# Patient Record
Sex: FEMALE
[De-identification: names, ages, dates, MRNs, and addresses within clinical notes are randomized per-mention and may not be internally consistent; named-entity substitution may affect disease eponyms.]

---

## 2021-03-30 PROBLEM — Z00.00 ENCOUNTER FOR PREVENTIVE HEALTH EXAMINATION: Status: ACTIVE | Noted: 2021-03-30

## 2021-04-26 ENCOUNTER — APPOINTMENT (OUTPATIENT)
Dept: NEUROSURGERY | Facility: CLINIC | Age: 46
End: 2021-04-26
Payer: SELF-PAY

## 2021-04-26 VITALS — WEIGHT: 190 LBS | BODY MASS INDEX: 27.2 KG/M2 | HEIGHT: 70 IN

## 2021-04-26 DIAGNOSIS — Z78.9 OTHER SPECIFIED HEALTH STATUS: ICD-10-CM

## 2021-04-26 PROCEDURE — ZZZZZ: CPT

## 2021-04-26 NOTE — REASON FOR VISIT
[Home] : at home, [unfilled] , at the time of the visit. [Medical Office: ([facility])___] : at the medical office located in  [Verbal consent obtained from patient] : the patient, [unfilled] [New Patient Visit] : a new patient visit [FreeTextEntry1] : r/o Betty

## 2021-04-26 NOTE — HISTORY OF PRESENT ILLNESS
[de-identified] : Ms. Middleton is a pleasant 47yo female who presents today with a history of many neurological symptoms who presents today for follow up.  She is a known patient to me from [place] where I saw her once in 6/2020 for work up of possible dural AVF.  For the past three years she has been having worsening symptoms which include confirmed urine retention, sustained ankle clonus, +hoffmans, hyperreflexia in her knees.  She is under the care of many neurologists who she states cannot figure out a diagnosis despite multiple tests.  She also has had a rheumatology work up which was negative.  Most recently, she had a catheter angiogram at [facility] which she states has been reviewed by multiple neurosurgeons and confirmed negative.  Her symptoms also include vertigo/blurry vision with exertion, intermittent headaches, right ear pulsatile tinnitus (worse with looking up), generalized extremity weakness (R>L), numbness and tingling in lower extremities while sitting.  No speech problems or swallowing difficulties.\par

## 2021-04-26 NOTE — ASSESSMENT
[FreeTextEntry1] : Impression:\par Complex history of neurological symptoms without definite etiology despite multiple scans.\par Concern for spinal DAVF; catheter angiogram at [facility] was negative.\par Concern for venous sinus stenosis or thrombosis. will send CTV for review\par \par PLAN\par Formulate plan after CTV from Feb 2021 is submitted for review\par

## 2021-05-18 ENCOUNTER — TRANSCRIPTION ENCOUNTER (OUTPATIENT)
Age: 46
End: 2021-05-18

## 2022-06-16 ENCOUNTER — APPOINTMENT (OUTPATIENT)
Dept: NEUROSURGERY | Facility: CLINIC | Age: 47
End: 2022-06-16

## 2022-06-16 DIAGNOSIS — I67.1 CEREBRAL ANEURYSM, NONRUPTURED: ICD-10-CM

## 2022-06-16 PROCEDURE — ZZZZZ: CPT

## 2022-06-16 NOTE — REASON FOR VISIT
[Follow-Up: _____] : a [unfilled] follow-up visit [Home] : at home, [unfilled] , at the time of the visit. [Medical Office: ([facility])___] : at the medical office located in  [Verbal consent obtained from patient] : the patient, [unfilled]

## 2022-06-18 NOTE — ASSESSMENT
[FreeTextEntry1] : Impression:\par Complex history of neurological symptoms without definite etiology despite multiple scans.\par Concern for spinal DAVF; catheter angiogram at [facility] was negative.\par \par Progressing neurological symptoms since our last visit a year ago.  She has had 2 new MRAs and a spinal angiogram, all which she was unable to send to me.  I have asked her to send them to me for my review and then I will get back to her with a plan.\par \par PLAN\par Send me recent Imaging\par Follow Up After we received them for review

## 2022-06-18 NOTE — HISTORY OF PRESENT ILLNESS
[Home] : at home, [unfilled] , at the time of the visit. [Medical Office: ([facility])___] : at the medical office located in  [Verbal consent obtained from patient] : the patient, [unfilled] [de-identified] : Ms. Middleton is a pleasant 48yo female who presents today with a history of many neurological symptoms who presents today for follow up.  She is a known patient to me from [place] where I saw her once in 6/2020 for work up of possible dural AVF.  For the past three years she has been having worsening symptoms which include confirmed urine retention, sustained ankle clonus, +hoffmans, hyperreflexia in her knees.  She is under the care of many neurologists who she states cannot figure out a diagnosis despite multiple tests.  She also has had a rheumatology work up which was negative.  Most recently, she had a catheter angiogram at [facility] which she states has been reviewed by multiple neurosurgeons and confirmed negative.  Her symptoms also include vertigo/blurry vision with exertion, intermittent headaches, right ear pulsatile tinnitus (worse with looking up), generalized extremity weakness (R>L), numbness and tingling in lower extremities while sitting.  No speech problems or swallowing difficulties.\par \par Since our last visit she states she was told she has increased tone in her lower extremities and a feeling of fire ants/burning all over her legs.  She has difficulty breathing while laying flat - pulmonary function test shows neuromuscular weakness.  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